# Patient Record
Sex: FEMALE | URBAN - METROPOLITAN AREA
[De-identification: names, ages, dates, MRNs, and addresses within clinical notes are randomized per-mention and may not be internally consistent; named-entity substitution may affect disease eponyms.]

---

## 2023-05-30 ENCOUNTER — APPOINTMENT (RX ONLY)
Dept: URBAN - METROPOLITAN AREA CLINIC 55 | Facility: CLINIC | Age: 63
Setting detail: DERMATOLOGY
End: 2023-05-30

## 2023-05-30 DIAGNOSIS — Z41.9 ENCOUNTER FOR PROCEDURE FOR PURPOSES OTHER THAN REMEDYING HEALTH STATE, UNSPECIFIED: ICD-10-CM

## 2023-05-30 PROCEDURE — ? INVENTORY

## 2023-05-30 PROCEDURE — ? BOTOX

## 2023-05-30 NOTE — PROCEDURE: BOTOX
Left Periorbital Skin Units: 0
Glabellar Complex Units: 12
Additional Area 3 Location: gummy smile
Show Additional Area 6: Yes
Show Mentalis Units: No
Dilution (U/0.1 Cc): 4
Additional Area 2 Location: Nasalis
Detail Level: Detailed
Additional Area 1 Location: Lip
Incrementing Botox Units: By 0.5 Units
Consent obtained. Risks include but not limited to lid/brow ptosis, bruising, swelling, diplopia, temporary effect, incomplete chemical denervation.
Post-Care Instructions: Patient instructed to not lie down for 4 hours and limit physical activity for 24 hours.
Show Inventory Tab: Hide